# Patient Record
Sex: MALE | Race: WHITE | NOT HISPANIC OR LATINO | Employment: FULL TIME | URBAN - METROPOLITAN AREA
[De-identification: names, ages, dates, MRNs, and addresses within clinical notes are randomized per-mention and may not be internally consistent; named-entity substitution may affect disease eponyms.]

---

## 2024-11-13 ENCOUNTER — OFFICE VISIT (OUTPATIENT)
Dept: URGENT CARE | Age: 34
End: 2024-11-13
Payer: COMMERCIAL

## 2024-11-13 VITALS — DIASTOLIC BLOOD PRESSURE: 88 MMHG | SYSTOLIC BLOOD PRESSURE: 128 MMHG | OXYGEN SATURATION: 97 % | HEART RATE: 84 BPM

## 2024-11-13 DIAGNOSIS — H60.502 ACUTE OTITIS EXTERNA OF LEFT EAR, UNSPECIFIED TYPE: Primary | ICD-10-CM

## 2024-11-13 PROCEDURE — 99203 OFFICE O/P NEW LOW 30 MIN: CPT

## 2024-11-13 RX ORDER — LEVOTHYROXINE SODIUM 50 UG/1
TABLET ORAL
COMMUNITY
Start: 2024-11-12

## 2024-11-13 RX ORDER — AMOXICILLIN 500 MG/1
CAPSULE ORAL
COMMUNITY
Start: 2024-09-23

## 2024-11-13 RX ORDER — NEOMYCIN SULFATE, POLYMYXIN B SULFATE, HYDROCORTISONE 3.5; 10000; 1 MG/ML; [USP'U]/ML; MG/ML
3 SOLUTION/ DROPS AURICULAR (OTIC) 4 TIMES DAILY
Qty: 10 ML | Refills: 0 | Status: SHIPPED | OUTPATIENT
Start: 2024-11-13 | End: 2024-11-23

## 2024-11-13 ASSESSMENT — ENCOUNTER SYMPTOMS
CHOKING: 0
RHINORRHEA: 0
SORE THROAT: 0
CHEST TIGHTNESS: 0
FEVER: 0
NAUSEA: 0
CHILLS: 0
COUGH: 1
PALPITATIONS: 0
DIARRHEA: 0
TROUBLE SWALLOWING: 0
WHEEZING: 0
CONSTIPATION: 0
SHORTNESS OF BREATH: 0

## 2024-11-13 NOTE — PROGRESS NOTES
Subjective   Patient ID: Miguel Winters is a 34 y.o. male. They present today with a chief complaint of Earache (R ear pain into jaw from left ear /Feels like hearing is muffled /Currently taking amox from dentist ).    History of Present Illness  Patient presents with left ear pain and muffled hearing in left ear. Explains that he is already being treated for dental infection on left side with amoxicillin, has been following up with dentist. Denies discharge/drainage. Denies fever, chills, sweats. Denies n/v/d. Denies chest pain, sob.   Denies uri symptoms including cough, congestion.       Earache   Associated symptoms include coughing. Pertinent negatives include no diarrhea, rash, rhinorrhea or sore throat.       Past Medical History  Allergies as of 11/13/2024    (No Known Allergies)       (Not in a hospital admission)       No past medical history on file.    No past surgical history on file.         Review of Systems  Review of Systems   Constitutional:  Negative for chills and fever.   HENT:  Positive for dental problem and ear pain. Negative for congestion, postnasal drip, rhinorrhea, sore throat and trouble swallowing.    Respiratory:  Positive for cough. Negative for choking, chest tightness, shortness of breath and wheezing.    Cardiovascular:  Negative for chest pain and palpitations.   Gastrointestinal:  Negative for constipation, diarrhea and nausea.   Skin:  Negative for rash.                                  Objective    Vitals:    11/13/24 1237   BP: 128/88   Pulse: 84   SpO2: 97%     No LMP for male patient.    Physical Exam  Constitutional:       General: He is not in acute distress.     Appearance: He is not toxic-appearing.   HENT:      Right Ear: Tympanic membrane and external ear normal.      Left Ear: Tympanic membrane normal.      Ears:      Comments: Left external canal with erythema, swelling. Tenderness to palpation over left tragus.      Nose: No congestion.      Right Sinus: No frontal  sinus tenderness.      Left Sinus: No frontal sinus tenderness.      Mouth/Throat:      Pharynx: No oropharyngeal exudate, posterior oropharyngeal erythema or postnasal drip.   Eyes:      Pupils: Pupils are equal, round, and reactive to light.   Cardiovascular:      Rate and Rhythm: Normal rate and regular rhythm.      Heart sounds: Normal heart sounds.   Pulmonary:      Effort: Pulmonary effort is normal. No respiratory distress.      Breath sounds: Normal breath sounds. No wheezing.   Musculoskeletal:      Cervical back: Normal range of motion.   Neurological:      Mental Status: He is alert.         Procedures    Point of Care Test & Imaging Results from this visit  No results found for this visit on 11/13/24.   No results found.    Diagnostic study results (if any) were reviewed by Melinda Jenkins PA-C.    Assessment/Plan   Allergies, medications, history, and pertinent labs/EKGs/Imaging reviewed by Melinda Jenkins PA-C.     Medical Decision Making  MDM- signs and symptoms consistent with acute otitis externa. No evidence of AOM. No evidence of mastoiditis. Patient given antibiotic drops. Patient advised to return to clinic or present to ED if symptoms change or worsen. Otherwise follow-up with PCP. Patient verbalized understanding and agrees with plan.       Orders and Diagnoses  Diagnoses and all orders for this visit:  Acute otitis externa of left ear, unspecified type  -     neomycin-polymyxin-HC (Cortisporin) otic solution; Administer 3 drops into the left ear 4 times a day for 10 days.      Medical Admin Record      Patient disposition: Home    Electronically signed by Melinda Jenkins PA-C  12:52 PM